# Patient Record
Sex: MALE | Race: BLACK OR AFRICAN AMERICAN | NOT HISPANIC OR LATINO | Employment: UNEMPLOYED | URBAN - METROPOLITAN AREA
[De-identification: names, ages, dates, MRNs, and addresses within clinical notes are randomized per-mention and may not be internally consistent; named-entity substitution may affect disease eponyms.]

---

## 2021-01-05 PROBLEM — R07.9 CHEST PAIN: Status: ACTIVE | Noted: 2021-01-05

## 2021-01-05 PROBLEM — R55 SYNCOPE: Status: ACTIVE | Noted: 2021-01-05

## 2021-01-05 PROBLEM — I10 HYPERTENSION: Status: ACTIVE | Noted: 2021-01-05

## 2021-09-28 PROBLEM — I63.9 CVA (CEREBRAL VASCULAR ACCIDENT): Status: ACTIVE | Noted: 2021-09-28

## 2021-10-02 PROBLEM — Z00.00 PREVENTATIVE HEALTH CARE: Status: ACTIVE | Noted: 2021-10-02

## 2021-10-02 PROBLEM — N18.2 CKD (CHRONIC KIDNEY DISEASE) STAGE 2, GFR 60-89 ML/MIN: Status: ACTIVE | Noted: 2021-10-02

## 2022-01-03 PROBLEM — Z00.00 PREVENTATIVE HEALTH CARE: Status: RESOLVED | Noted: 2021-10-02 | Resolved: 2022-01-03

## 2022-03-29 PROBLEM — G37.9 DEMYELINATING DISEASE: Status: ACTIVE | Noted: 2022-03-29

## 2022-03-29 PROBLEM — R29.898 WEAKNESS OF EXTREMITY: Status: ACTIVE | Noted: 2022-03-29

## 2022-04-12 PROBLEM — I69.359 HEMIPARESIS AFFECTING DOMINANT SIDE AS LATE EFFECT OF CEREBROVASCULAR ACCIDENT: Status: ACTIVE | Noted: 2022-04-12

## 2023-12-21 ENCOUNTER — SOCIAL WORK (OUTPATIENT)
Dept: ADMINISTRATIVE | Facility: OTHER | Age: 48
End: 2023-12-21

## 2023-12-21 NOTE — PROGRESS NOTES
SW received consult for [facility]. SW placed a call to pt to discuss. SW spoke w/ pt sister (Ethel). Pt preference is [facility]. SW faxed home health referral as requested. SW following.     Ludwin Maldonado [facility]   [phone] phone   [phone] fax     [facility]    [phone] (phone)  [phone] (fax)

## 2023-12-27 ENCOUNTER — SOCIAL WORK (OUTPATIENT)
Dept: ADMINISTRATIVE | Facility: OTHER | Age: 48
End: 2023-12-27

## 2023-12-27 NOTE — PROGRESS NOTES
Call received from Mayuri at [facility] stating she needs a face-to-face documented in order to set up services.  [facility] messaged Dr. Phipps with this request.  Once done, AYDE will fax to [facility] to finalize arrangements.  Will con't to follow    Freda Bullock, JUDY  Ext [phone]

## 2023-12-28 ENCOUNTER — SOCIAL WORK (OUTPATIENT)
Dept: ADMINISTRATIVE | Facility: OTHER | Age: 48
End: 2023-12-28

## 2023-12-28 NOTE — PROGRESS NOTES
"AYDE placed a follow up phone call to [facility]  to ensure face to face document was received. Per Mayuri, face to face was received but "MD is not in our system and will need to be added." AYDE provided fax # and awaiting more paperwork necessary for home health services. AYDE asked Mayuri when would home health services begin for the pt. Mayuri states pt cannot be seen until paperwork from MD is completed and filled out. SW following.     Bladimir Farrar, [facility]    [phone] (phone)  [phone] (fax)    "

## 2023-12-28 NOTE — PROGRESS NOTES
AYDE met with Dr. Phipps in  clinic and obtained completed face top face encounter document necessary to begin home health agency. [facility] @ [phone] as requested. AYDE following.     Bladimir Farrar MSW    [phone] (phone)  [phone] (fax)

## 2024-01-03 ENCOUNTER — SOCIAL WORK (OUTPATIENT)
Dept: ADMINISTRATIVE | Facility: OTHER | Age: 49
End: 2024-01-03

## 2024-01-03 NOTE — PROGRESS NOTES
AYDE placed a follow up phone call to [facility] (Mayuri) to receive update on hh referral sent. Per Mayuri, paper work was faxed for Dr. Phipps in order to be added into [facility] system. SW informed paperwork was never received and reconfirmed fax #. Mayuri states she will re-fax the packet. SW awaiting paperwork. SW following.     Bladimir Farrar, [facility]    [phone] (phone)  [phone] (fax)

## 2024-01-04 ENCOUNTER — SOCIAL WORK (OUTPATIENT)
Dept: ADMINISTRATIVE | Facility: OTHER | Age: 49
End: 2024-01-04

## 2024-01-04 NOTE — PROGRESS NOTES
AYDE received paperwork from [facility]. AYDE met with Dr. Phipps to complete. AYDE faxed back to [facility] as requested to [phone]. AYDE following.     Bladimir Farrar, [facility]    [phone] (phone)  [phone] (fax)

## 2024-01-05 ENCOUNTER — SOCIAL WORK (OUTPATIENT)
Dept: ADMINISTRATIVE | Facility: OTHER | Age: 49
End: 2024-01-05

## 2024-01-05 NOTE — PROGRESS NOTES
AYDE placed f/u call to Mayuri w/ [facility]. Per Mayuri, paperwork was received and pt is scheduled for first initial home visit for this weekend. No other needs at this time.     Bladimir Farrar MSW    [phone] (phone)  [phone] (fax)

## 2024-02-23 PROBLEM — F32.9 MDD (MAJOR DEPRESSIVE DISORDER): Status: ACTIVE | Noted: 2024-02-23

## 2024-02-27 ENCOUNTER — SOCIAL WORK (OUTPATIENT)
Dept: ADMINISTRATIVE | Facility: OTHER | Age: 49
End: 2024-02-27

## 2024-02-27 NOTE — PROGRESS NOTES
SW received consult but it was blank. Pt is needing a WC, walker, and home health per MD via secure chat. AYDE placed a call to pt to provide assistance SW spoke w/ py's sister (Ethel). Pt is wanting a WC, rollator, hh services, and in home aide. No preference for hh agency. AYDE explained process for obtaining long term personal care services and provided contact information to initiate application process. AYDE scanned and emailed hh referral to [facility] (an in network home health agency). AYDE faxed DME order for WC and rollator to [facility] for review.     [facility]   [phone] phone   [phone] fax     [facility]   [phone] phone Option #3    [facility]    [phone] (phone)  [phone] (fax)

## 2024-03-01 ENCOUNTER — SOCIAL WORK (OUTPATIENT)
Dept: ADMINISTRATIVE | Facility: OTHER | Age: 49
End: 2024-03-01

## 2024-03-01 NOTE — PROGRESS NOTES
AYDE placed a f/u phone call to [facility] in regards to WC and rollator. Per Edilia, pt received WC and rollator on today. AYDE placed a f/u phone call to Estefania Zaidi in regards to hh referral sent. Per Estefania, pt has been approved by insurance for hh services. First initial home visit is scheduled for today. No other needs at this time.     Bladimir Farrar, [facility]    [phone] (phone)  [phone] (fax)

## 2024-04-10 PROBLEM — D12.9 BENIGN NEOPLASM OF RECTUM AND ANAL CANAL: Status: ACTIVE | Noted: 2024-04-10

## 2024-04-10 PROBLEM — D12.5 BENIGN NEOPLASM OF SIGMOID COLON: Status: ACTIVE | Noted: 2024-04-10

## 2024-04-10 PROBLEM — D12.8 BENIGN NEOPLASM OF RECTUM AND ANAL CANAL: Status: ACTIVE | Noted: 2024-04-10

## 2024-04-10 PROBLEM — D12.2 BENIGN NEOPLASM OF ASCENDING COLON: Status: ACTIVE | Noted: 2024-04-10
